# Patient Record
Sex: MALE | Race: WHITE
[De-identification: names, ages, dates, MRNs, and addresses within clinical notes are randomized per-mention and may not be internally consistent; named-entity substitution may affect disease eponyms.]

---

## 2020-09-14 ENCOUNTER — HOSPITAL ENCOUNTER (OUTPATIENT)
Dept: HOSPITAL 96 - M.LAB | Age: 40
End: 2020-09-14
Attending: SURGERY
Payer: COMMERCIAL

## 2020-09-14 DIAGNOSIS — Z20.828: ICD-10-CM

## 2020-09-14 DIAGNOSIS — K57.33: ICD-10-CM

## 2020-09-14 DIAGNOSIS — Z01.812: Primary | ICD-10-CM

## 2020-09-22 ENCOUNTER — HOSPITAL ENCOUNTER (INPATIENT)
Dept: HOSPITAL 96 - M.PRE | Age: 40
LOS: 4 days | Discharge: HOME | DRG: 348 | End: 2020-09-26
Attending: SURGERY | Admitting: SURGERY
Payer: COMMERCIAL

## 2020-09-22 VITALS — BODY MASS INDEX: 37.93 KG/M2 | HEIGHT: 72 IN | WEIGHT: 280 LBS

## 2020-09-22 DIAGNOSIS — K56.699: ICD-10-CM

## 2020-09-22 DIAGNOSIS — M25.512: ICD-10-CM

## 2020-09-22 DIAGNOSIS — K57.20: Primary | ICD-10-CM

## 2020-09-22 DIAGNOSIS — J45.909: ICD-10-CM

## 2020-09-22 DIAGNOSIS — K21.9: ICD-10-CM

## 2020-09-22 NOTE — OP
[address]  [address]                    OPERATIVE REPORT              
_______________________________________________________________________________
 
Name:       APRIL ARENAS                 Room:           [address] [place]  
.R.#:  [id number]      Account #:      [id number]  
Admission:  09/22/20     Attend Phys:    Mitzi Sandoval DO
Discharge:               Date of Birth:  [birth year]  
         Report #: [id number]
                                                                     [id number]  
_______________________________________________________________________________
THIS REPORT FOR:  //name//                      
 
cc:  DONATO - Margarette family physician/PCP 
     DONATO - Margarette family physician/PCP                                        ~
CC: Mitzi Sandoval
    [facility] physician/PCP
    Jefry Rasheed
 
DICTATED BY: Lasha Rose DO
 
DATE OF SERVICE:  09/22/2020
 
 
PREOPERATIVE DIAGNOSIS:  Diverticular stricture.
 
POSTOPERATIVE DIAGNOSIS:  Diverticular stricture.
 
PROCEDURE PERFORMED:  Laparoscopic sigmoidectomy with colorectal anastomosis,
splenic flexure takedown and rigid proctosigmoidoscopy.
 
ANESTHESIA:  General, regional and local.
 
PRIMARY SURGEON:  Mitzi Sandoval DO
 
CO-SURGEON:  Lasha Rose, PGY5
 
ASSISTANT:  Helio Latif, PGY4
 
COMPLICATIONS:  None.
 
ESTIMATED BLOOD LOSS:  50 mL.
 
FINDINGS:  Noted tattooing of colon, severe inflammation adherent to the left of
the sigmoid colon adherent to the left pelvic sidewall, associated inflamed and
adherent loop of small bowel, poor colon prep.
 
INDICATIONS:  The patient is a 39-year-old male with history of diverticulitis
requiring percutaneous drainage.  He recently underwent colonoscopy and was
found to have a diverticular stricture that was able to be traversed with a
pediatric colonoscope.  The patient was informed of the risks and benefits of
laparoscopic sigmoidectomy with risks including but not limited to bleeding,
infection, bowel injury, bladder injury, need for open procedure, anastomotic
leak, hernia formation, and catastrophic complications, including death.  The
patient was in agreement with the surgical plan and wished to proceed.
 
DESCRIPTION OF PROCEDURE:  After informed consent was obtained, the patient was
 
 
 
[place] [address]                    OPERATIVE REPORT              
_______________________________________________________________________________
 
Name:       APRIL ARENAS                 Room:           [address] [place]  
[facility]#:  [id number]      Account #:      [id number]  
Admission:  09/22/20     Attend Phys:    Mitzi Sandoval DO
Discharge:               Date of Birth:  [birth year]  
         Report #: [id number]
                                                                     [id number]  
_______________________________________________________________________________
brought to the operating room and placed in supine position.  SCDs were on and
running.  Preoperative antibiotics were given.  General anesthesia was
administered with an ET tube.  The patient received bilateral transversus
abdominis plane blocks.  The patient was positioned into lithotomy.  A Lujan
catheter was placed sterilely.  The patient was positioned with both arms tucked
and both arms were secured.  IVs were running and the pulse oximetry was intact.
 A surgical pause was held to confirm proper patient and procedure.  A
supraumbilical 2 cm incision was made with an 11 blade after local anesthetic
infiltration.  Dissection was bluntly carried down to the fascia, which was
elevated with 2 Kochers and incised using cautery.  Peritoneum was bluntly
entered using a Maru.  Bilateral stay sutures were placed using 0 Vicryl.  A
12-mm Eduard trocar was introduced and the abdomen was insufflated.  A brief
exploration was undertaken.  There was no obvious active inflammation or
infection.  A 5-mm port was placed in the right lower quadrant and an additional
12-mm port was placed in the suprapubic region.  Laparoscopic Babcocks were used
to reflect the sigmoid colon medially.  There was some inflammation chronic in
nature with adhesions from the sigmoid colon through the left pelvic sidewall. 
There was extremely hard and woody inflammation of the segment of the sigmoid
colon.  Distally, there was a tattoo noted at the distal rectosigmoid junction. 
The rectum and distal sigmoid appeared very soft.  The proximal sigmoid had a
separate area of distinct inflammation with an associated loop of small bowel
that was adherent, but proximal to this and the left colon was soft and there
was no obvious diverticula.  Blunt dissection was used to sweep the sigmoid
colon off of the pelvic sidewall using a suction .  The white line of
Toldt was opened using cautery as well as a LigaSure, 5-mm device.  Dissection
was carried up the sigmoid to the left colon and up to the splenic flexure. 
Splenic flexure was taken down with a combination of blunt dissection and the
LigaSure.  Dissection was then carried back down the left colon towards the
sigmoid.  Further dissection was used to completely mobilize the distinctly
chronically inflamed portion of the sigmoid, which just was right at the level
of the pelvic brim.  The peritoneum on the right and left of the rectosigmoid
was opened using cautery.  Once the lateral portion of the left colon splenic
flexure and sigmoid were completely mobilized, a window was created at the
distal extent of the inflammation using blunt dissection and the LigaSure.  Once
completely through the mesocolon at the rectosigmoid junction, a 45-mm purple
load Covidien stapler was fired across the rectum.  Two additional purple loads
45 mm were used to completely transect the colon.  Once completely free, the
staple line was inspected and the staple line was insufflated and there was no
leak at the rectal stump.  The mesocolon was then taken down using the LigaSure.
 Once adequately mobilized to the healthy portion of the left colon, a
laparoscopic grasper was placed on the distal portion of the transected sigmoid
and the abdomen was desufflated.  The supraumbilical incision was extended to
approximately 8 cm.  Dissection was carried through the fascia using cautery. 
An Ceferino wound protector was introduced.  The sigmoid was grasped and
exteriorized through the wound protector.  Once it was completely exteriorized,
the healthy bowel was grasped.  Heavy curved Mayos were used to transect the
 
 
 
[place] [id number]                    OPERATIVE REPORT              
_______________________________________________________________________________
 
Name:       APRIL ARENAS                 Room:           [address] [place]  
..#:  [id number]      Account #:      [id number]  
Admission:  09/22/20     Attend Phys:    Mitzi Sandoval DO
Discharge:               Date of Birth:  [birth year]  
         Report #: [id number]
                                                                     [id number]  
_______________________________________________________________________________
proximal portion of the sigmoid.  The specimen was completely free and handed
off to the backtable.  Allis clamps were used to grasp the proximal portion of
the bowel circumferentially.  The EEA sizers were used and the patient easily
accommodated a 31-mm sizer within his colon.  The 31-mm EEA was selected.  The
anvil was tagged with 0 silk and then introduced into the proximal colon.  A
60-mm purple load was fired across the colon after grasping the edges together
with an Allis.  This transected the silk stitch, which was grasped and retracted
to bring the spike through anterior to the staple line in a perfect position to
ensure seal around the base of the anvil.  The 0 silk was used to pursestring
around the base of the anvil.  Of note, prior to transecting and exteriorizing
the specimen, a loop of small bowel that was adherent to the chronically
inflamed portion of the sigmoid colon was dissected free from the colon using
laparoscopic glenn and blunt dissection.  Afterwards, this was closely
inspected, there were no serosal injuries, although this portion of the small
bowel was somewhat bruised from dissection, but appeared intact.  Succuss was
milked past this area with no leak and was reinspected at multiple points
throughout the case and was noted to be intact with no evidence of a
full-thickness injury or external serosal injury.  Once the anvil and spike were
secured, the spike was removed and the anvil was turned into the abdomen, the
cap was placed on the Ceferino wound protector.  The abdomen was reinsufflated. 
The sizers were passed transanally until the 31-mm sizer easily passed the 31-mm
EEA Covidien stapler was introduced through the anus up to the rectal staple
line.  The spike was opened just anterior to the rectal staple line.  The anvil
was secured to the spike and the EEA was closed and this stapler was deployed. 
The stapler easily removed from the anus.  Of note, the colon prep was poor and
there was stool throughout the entire colon.  A suction  was used to
irrigate the pelvis and a leak test was performed.  There were some bubbles from
a focal point at the anterior of the staple line.  The anastomosis was tension
free.  However, there was a small focal area of bubbling.  The EndoStitch with
an 0 Polysorb stitch was used to place a figure-of-eight over this area of the
staple line in a Lembert fashion.  This was tied and cut.  The leak test was
repeated and was negative.  There was no bubbling after inspection and
submersion of the staple line once more.  The staple line was covered as well as
the anterior abdominal structures with the omentum.  The 12-mm suprapubic port
was closed with an 0 Vicryl in a PMI device.  A SWAPNA drain was placed along the
left pericolic gutter and along the anastomosis through the right lower quadrant
5-mm port.  This was secured in place using a 2-0 nylon.  The extraction site
Ceferino wound protector was removed and the fascia was closed as well as the
peritoneum with a 2-0 Prolene in a running continuous manner from the superior
and inferior aspects of the wound and a small bites' fashion.  The wound was
then closed in layered fashion using 3-0 Vicryl and 4-0 Monocryl.  Remainder of
skin incisions were closed using 4-0 Monocryl.  Wounds were cleansed and dressed
 
 
 
[place] [address]                    OPERATIVE REPORT              
_______________________________________________________________________________
 
Name:       APRIL ARENAS                 Room:           [address] [place]  
..#:  [id number]      Account #:      [id number]  
Admission:  09/22/20     Attend Phys:    Mitzi Sandoval DO
Discharge:               Date of Birth:  [birth year]  
         Report #: [id number]
                                                                     [id number]  
_______________________________________________________________________________
with Dermabond.  A drain sponge was applied at the SWAPNA drain site.  The patient
was emerged from anesthesia and transferred to PACU in stable condition.
 
 
 
 
 
 
 
 
 
 
 
 
 
 
 
 
 
 
 
 
 
 
 
 
 
 
 
 
 
 
 
 
 
 
 
 
 
 
 
 
 
 
                       
                                        By:                                
                 
D: 09/22/20 2312_______________________________________
T: 09/22/20 2343Cvan Sandoval DO            /nt

## 2020-09-23 VITALS — SYSTOLIC BLOOD PRESSURE: 117 MMHG | DIASTOLIC BLOOD PRESSURE: 59 MMHG

## 2020-09-23 VITALS — DIASTOLIC BLOOD PRESSURE: 78 MMHG | SYSTOLIC BLOOD PRESSURE: 118 MMHG

## 2020-09-23 VITALS — DIASTOLIC BLOOD PRESSURE: 69 MMHG | SYSTOLIC BLOOD PRESSURE: 124 MMHG

## 2020-09-23 VITALS — DIASTOLIC BLOOD PRESSURE: 67 MMHG | SYSTOLIC BLOOD PRESSURE: 118 MMHG

## 2020-09-23 VITALS — DIASTOLIC BLOOD PRESSURE: 86 MMHG | SYSTOLIC BLOOD PRESSURE: 139 MMHG

## 2020-09-23 VITALS — SYSTOLIC BLOOD PRESSURE: 120 MMHG | DIASTOLIC BLOOD PRESSURE: 67 MMHG

## 2020-09-23 LAB
ABSOLUTE MONOCYTES: 0.5 THOU/UL (ref 0–1.2)
ANION GAP SERPL CALC-SCNC: 7 MMOL/L (ref 7–16)
BUN SERPL-MCNC: 11 MG/DL (ref 7–18)
CALCIUM SERPL-MCNC: 8.5 MG/DL (ref 8.5–10.1)
CHLORIDE SERPL-SCNC: 100 MMOL/L (ref 98–107)
CO2 SERPL-SCNC: 27 MMOL/L (ref 21–32)
CREAT SERPL-MCNC: 1 MG/DL (ref 0.6–1.3)
GLUCOSE SERPL-MCNC: 134 MG/DL (ref 70–99)
GRANULOCYTES NFR BLD MANUAL: 93 %
HCT VFR BLD CALC: 39.4 % (ref 42–52)
HGB BLD-MCNC: 13.4 GM/DL (ref 14–18)
LYMPHOCYTES # BLD: 0.6 THOU/UL (ref 0.8–5.3)
LYMPHOCYTES NFR BLD AUTO: 4 %
MCH RBC QN AUTO: 30.9 PG (ref 26–34)
MCHC RBC AUTO-ENTMCNC: 34 G/DL (ref 28–37)
MCV RBC: 90.9 FL (ref 80–100)
MONOCYTES NFR BLD: 3 %
MPV: 8.1 FL. (ref 7.2–11.1)
NEUTROPHILS # BLD: 14.8 THOU/UL (ref 1.6–8.1)
NUCLEATED RBCS: 0 /100WBC
PLATELET # BLD EST: ADEQUATE 10*3/UL
PLATELET COUNT*: 307 THOU/UL (ref 150–400)
POTASSIUM SERPL-SCNC: 4.3 MMOL/L (ref 3.5–5.1)
RBC # BLD AUTO: 4.34 MIL/UL (ref 4.5–6)
RBC MORPH BLD: NORMAL
RDW-CV: 13.1 % (ref 10.5–14.5)
SODIUM SERPL-SCNC: 134 MMOL/L (ref 136–145)
WBC # BLD AUTO: 15.9 THOU/UL (ref 4–11)

## 2020-09-24 VITALS — DIASTOLIC BLOOD PRESSURE: 61 MMHG | SYSTOLIC BLOOD PRESSURE: 127 MMHG

## 2020-09-24 VITALS — DIASTOLIC BLOOD PRESSURE: 71 MMHG | SYSTOLIC BLOOD PRESSURE: 110 MMHG

## 2020-09-24 VITALS — DIASTOLIC BLOOD PRESSURE: 73 MMHG | SYSTOLIC BLOOD PRESSURE: 126 MMHG

## 2020-09-24 VITALS — DIASTOLIC BLOOD PRESSURE: 80 MMHG | SYSTOLIC BLOOD PRESSURE: 116 MMHG

## 2020-09-24 VITALS — SYSTOLIC BLOOD PRESSURE: 123 MMHG | DIASTOLIC BLOOD PRESSURE: 69 MMHG

## 2020-09-24 VITALS — DIASTOLIC BLOOD PRESSURE: 62 MMHG | SYSTOLIC BLOOD PRESSURE: 108 MMHG

## 2020-09-24 LAB
ABSOLUTE BASOPHILS: 0 THOU/UL (ref 0–0.2)
ABSOLUTE EOSINOPHILS: 0.1 THOU/UL (ref 0–0.7)
ABSOLUTE MONOCYTES: 1.1 THOU/UL (ref 0–1.2)
ANION GAP SERPL CALC-SCNC: 5 MMOL/L (ref 7–16)
BASOPHILS NFR BLD AUTO: 0.3 %
BUN SERPL-MCNC: 8 MG/DL (ref 7–18)
CALCIUM SERPL-MCNC: 8.4 MG/DL (ref 8.5–10.1)
CHLORIDE SERPL-SCNC: 101 MMOL/L (ref 98–107)
CO2 SERPL-SCNC: 29 MMOL/L (ref 21–32)
CREAT SERPL-MCNC: 0.8 MG/DL (ref 0.6–1.3)
EOSINOPHIL NFR BLD: 1 %
GLUCOSE SERPL-MCNC: 103 MG/DL (ref 70–99)
GRANULOCYTES NFR BLD MANUAL: 72.5 %
HCT VFR BLD CALC: 35.9 % (ref 42–52)
HGB BLD-MCNC: 12.3 GM/DL (ref 14–18)
LYMPHOCYTES # BLD: 1.7 THOU/UL (ref 0.8–5.3)
LYMPHOCYTES NFR BLD AUTO: 16.1 %
MAGNESIUM SERPL-MCNC: 1.9 MG/DL (ref 1.8–2.4)
MCH RBC QN AUTO: 31.4 PG (ref 26–34)
MCHC RBC AUTO-ENTMCNC: 34.2 G/DL (ref 28–37)
MCV RBC: 91.9 FL (ref 80–100)
MONOCYTES NFR BLD: 10.1 %
MPV: 7.9 FL. (ref 7.2–11.1)
NEUTROPHILS # BLD: 7.7 THOU/UL (ref 1.6–8.1)
NUCLEATED RBCS: 0 /100WBC
PHOSPHATE SERPL-MCNC: 2.3 MG/DL (ref 2.5–4.9)
PLATELET COUNT*: 257 THOU/UL (ref 150–400)
POTASSIUM SERPL-SCNC: 3.4 MMOL/L (ref 3.5–5.1)
RBC # BLD AUTO: 3.91 MIL/UL (ref 4.5–6)
RDW-CV: 13.1 % (ref 10.5–14.5)
SODIUM SERPL-SCNC: 135 MMOL/L (ref 136–145)
WBC # BLD AUTO: 10.7 THOU/UL (ref 4–11)

## 2020-09-25 VITALS — SYSTOLIC BLOOD PRESSURE: 116 MMHG | DIASTOLIC BLOOD PRESSURE: 68 MMHG

## 2020-09-25 VITALS — SYSTOLIC BLOOD PRESSURE: 119 MMHG | DIASTOLIC BLOOD PRESSURE: 65 MMHG

## 2020-09-25 VITALS — DIASTOLIC BLOOD PRESSURE: 60 MMHG | SYSTOLIC BLOOD PRESSURE: 107 MMHG

## 2020-09-25 LAB
ABSOLUTE BASOPHILS: 0 THOU/UL (ref 0–0.2)
ABSOLUTE EOSINOPHILS: 0.3 THOU/UL (ref 0–0.7)
ABSOLUTE MONOCYTES: 1.2 THOU/UL (ref 0–1.2)
ANION GAP SERPL CALC-SCNC: 5 MMOL/L (ref 7–16)
BASOPHILS NFR BLD AUTO: 0.4 %
BUN SERPL-MCNC: 6 MG/DL (ref 7–18)
CALCIUM SERPL-MCNC: 8.7 MG/DL (ref 8.5–10.1)
CHLORIDE SERPL-SCNC: 103 MMOL/L (ref 98–107)
CO2 SERPL-SCNC: 28 MMOL/L (ref 21–32)
CREAT SERPL-MCNC: 0.8 MG/DL (ref 0.6–1.3)
EOSINOPHIL NFR BLD: 2.5 %
GLUCOSE SERPL-MCNC: 101 MG/DL (ref 70–99)
GRANULOCYTES NFR BLD MANUAL: 65.7 %
HCT VFR BLD CALC: 34.7 % (ref 42–52)
HGB BLD-MCNC: 12 GM/DL (ref 14–18)
LYMPHOCYTES # BLD: 2 THOU/UL (ref 0.8–5.3)
LYMPHOCYTES NFR BLD AUTO: 19.5 %
MAGNESIUM SERPL-MCNC: 1.9 MG/DL (ref 1.8–2.4)
MCH RBC QN AUTO: 31.8 PG (ref 26–34)
MCHC RBC AUTO-ENTMCNC: 34.6 G/DL (ref 28–37)
MCV RBC: 92 FL (ref 80–100)
MONOCYTES NFR BLD: 11.9 %
MPV: 7.8 FL. (ref 7.2–11.1)
NEUTROPHILS # BLD: 6.8 THOU/UL (ref 1.6–8.1)
NUCLEATED RBCS: 0 /100WBC
PHOSPHATE SERPL-MCNC: 3.2 MG/DL (ref 2.5–4.9)
PLATELET COUNT*: 265 THOU/UL (ref 150–400)
POTASSIUM SERPL-SCNC: 3.9 MMOL/L (ref 3.5–5.1)
RBC # BLD AUTO: 3.77 MIL/UL (ref 4.5–6)
RDW-CV: 13 % (ref 10.5–14.5)
SODIUM SERPL-SCNC: 136 MMOL/L (ref 136–145)
WBC # BLD AUTO: 10.4 THOU/UL (ref 4–11)

## 2020-09-25 NOTE — PATH
[address]  [address]                    PATHOLOGY RPT PROCEDURE       
_______________________________________________________________________________
 
Name:       ENRIQUE RICH                 Room:           [address] [place]  
..#:  [id number]      Account #:      [id number]  
Admission:  09/22/20     Date of Birth:  [birth year]  
Discharge:                             Report #:    [id number]
                                                         Path Case #: [id number]
_______________________________________________________________________________
 
[facility] Accession Number: [id number]
.                                                                01
Material submitted:                                        .
sigmoid colon - SIGMOID COLON AND ANASTOMOTIC RINGS
.                                                                01
Clinical history:                                          .
DIVERTICULAR STRICTURE
.                                                                02
**********************************************************************
Diagnosis:
Sigmoid colon and anastomotic rings:
- Segment of benign colon with severe diverticular disease including
chronic and acute diverticulitis with perforation, abscess formation,
fibrosis, acute and chronic serositis and serosal adhesions.
- Five benign and hyperplastic pericolic lymph nodes.
- Two benign colonic anastomotic rings.
(FREDDIE:frank; 09/25/2020)
MBJAYESH  09/25/2020  1658 Local
**********************************************************************
.                                                                02
Electronically signed:                                     .
Khanh Quispe MD, Pathologist
NPI- [id number]
.                                                                01
Gross description:                                         .
The specimen is received in formalin, labeled "Enrique Rich, sigmoid colon
and anastomotic rings".  Received is an unoriented segment of colon
measuring 13.5 cm in length by 3.0 cm in diameter.  One margin is stapled
closed and the opposite margin is open.  The serosal surface is dusky
gray-brown and covered with overlying adhesions.  The attached pericolic
fat measures up to 2.5 cm in thickness.  The specimen is opened along the
antimesenteric line to reveal pink-tan mucosa with normal architectural
folds.  An area of abscess (questionable perforation) is identified in the
attached pericolic fat measuring 3.5 cm in length by up to 1.5 cm in
diameter filled with fecal material.  Multiple diverticula are identified,
several of which are hemorrhagic in appearance and filled with fecal
material.  Sectioning through the attached pericolic fat reveals five
readily identifiable lymph nodes ranging in size from 0.3 to 1.1 cm in
maximum dimensions.
.
Also received within the specimen container are two anastomotic rings of
light tan to gray-tan mucosa measuring 2.2 x 2.1 x 1.4 and 4.5 x 1.3 x 1.0
cm.  Both rings have staples present.  The specimen is submitted
representatively as follows:
.
A1     stapled margin
A2     open margin
A3-A6  representative sections of area of abscess (questionable
 
[place] [address]                    PATHOLOGY RPT PROCEDURE       
_______________________________________________________________________________
 
Name:       ENRIQUE RICH                 Room:           [address] [place]  
[facility].#:  [id number]      Account #:      [id number]  
Admission:  09/22/20     Date of Birth:  [birth year]  
Discharge:                             Report #:    [id number]
                                                         Path Case #: [id number]
_______________________________________________________________________________
perforation)
A7-A9  representative sections of diverticula
A10    intact lymph nodes
A11    one bisected lymph node
A12    one trisected lymph node
A13    representative sections from each anastomotic ring.
(CAA; 9/24/2020)
QAC/QAC  09/25/2020  1627 Local
.                                                                02
Pathologist provided ICD-10:
K57.32, K57.30, K65.8
.                                                                02
CPT                                                        .
736322
Specimen Comment: A courtesy copy of this report has been sent to [phone]
Specimen Comment: Report sent to 
***Performed at:  01
   [facility], [place]  [id number]
   MD Morgan Sears MD Phone:  [phone]
***Performed at:  02
   [facility]
   [address], [place], [place]  [id number]
   MD Khanh Quispe MD Phone:  [phone]

## 2020-09-26 VITALS — DIASTOLIC BLOOD PRESSURE: 81 MMHG | SYSTOLIC BLOOD PRESSURE: 127 MMHG

## 2020-09-26 VITALS — SYSTOLIC BLOOD PRESSURE: 127 MMHG | DIASTOLIC BLOOD PRESSURE: 81 MMHG

## 2020-09-26 LAB
ABSOLUTE BASOPHILS: 0 THOU/UL (ref 0–0.2)
ABSOLUTE EOSINOPHILS: 0.3 THOU/UL (ref 0–0.7)
ABSOLUTE MONOCYTES: 0.7 THOU/UL (ref 0–1.2)
ANION GAP SERPL CALC-SCNC: 6 MMOL/L (ref 7–16)
BASOPHILS NFR BLD AUTO: 0.6 %
BUN SERPL-MCNC: 6 MG/DL (ref 7–18)
CALCIUM SERPL-MCNC: 9.1 MG/DL (ref 8.5–10.1)
CHLORIDE SERPL-SCNC: 102 MMOL/L (ref 98–107)
CO2 SERPL-SCNC: 31 MMOL/L (ref 21–32)
CREAT SERPL-MCNC: 1 MG/DL (ref 0.6–1.3)
EOSINOPHIL NFR BLD: 3.4 %
GLUCOSE SERPL-MCNC: 91 MG/DL (ref 70–99)
GRANULOCYTES NFR BLD MANUAL: 62.2 %
HCT VFR BLD CALC: 36.9 % (ref 42–52)
HGB BLD-MCNC: 12.6 GM/DL (ref 14–18)
LYMPHOCYTES # BLD: 2 THOU/UL (ref 0.8–5.3)
LYMPHOCYTES NFR BLD AUTO: 24.5 %
MAGNESIUM SERPL-MCNC: 2.2 MG/DL (ref 1.8–2.4)
MCH RBC QN AUTO: 31.5 PG (ref 26–34)
MCHC RBC AUTO-ENTMCNC: 34.3 G/DL (ref 28–37)
MCV RBC: 91.8 FL (ref 80–100)
MONOCYTES NFR BLD: 9.3 %
MPV: 8.1 FL. (ref 7.2–11.1)
NEUTROPHILS # BLD: 5 THOU/UL (ref 1.6–8.1)
NUCLEATED RBCS: 0 /100WBC
PHOSPHATE SERPL-MCNC: 3.8 MG/DL (ref 2.5–4.9)
PLATELET COUNT*: 310 THOU/UL (ref 150–400)
POTASSIUM SERPL-SCNC: 3.9 MMOL/L (ref 3.5–5.1)
RBC # BLD AUTO: 4.01 MIL/UL (ref 4.5–6)
RDW-CV: 12.8 % (ref 10.5–14.5)
SODIUM SERPL-SCNC: 139 MMOL/L (ref 136–145)
WBC # BLD AUTO: 8 THOU/UL (ref 4–11)

## 2020-12-29 ENCOUNTER — HOSPITAL ENCOUNTER (EMERGENCY)
Dept: HOSPITAL 96 - M.ERS | Age: 40
Discharge: HOME | End: 2020-12-29
Payer: COMMERCIAL

## 2020-12-29 VITALS — SYSTOLIC BLOOD PRESSURE: 112 MMHG | DIASTOLIC BLOOD PRESSURE: 70 MMHG

## 2020-12-29 VITALS — WEIGHT: 285.01 LBS | HEIGHT: 73 IN | BODY MASS INDEX: 37.77 KG/M2

## 2020-12-29 DIAGNOSIS — K52.9: Primary | ICD-10-CM

## 2020-12-29 DIAGNOSIS — K21.9: ICD-10-CM

## 2020-12-29 DIAGNOSIS — Z90.49: ICD-10-CM

## 2020-12-29 DIAGNOSIS — K43.9: ICD-10-CM

## 2020-12-29 DIAGNOSIS — J45.909: ICD-10-CM

## 2020-12-29 LAB
ABSOLUTE BASOPHILS: 0 THOU/UL (ref 0–0.2)
ABSOLUTE EOSINOPHILS: 0.2 THOU/UL (ref 0–0.7)
ABSOLUTE MONOCYTES: 0.7 THOU/UL (ref 0–1.2)
ALBUMIN SERPL-MCNC: 3.9 G/DL (ref 3.4–5)
ALP SERPL-CCNC: 84 U/L (ref 46–116)
ALT SERPL-CCNC: 43 U/L (ref 30–65)
ANION GAP SERPL CALC-SCNC: 5 MMOL/L (ref 7–16)
AST SERPL-CCNC: 36 U/L (ref 15–37)
BASOPHILS NFR BLD AUTO: 0.7 %
BILIRUB SERPL-MCNC: 0.7 MG/DL
BILIRUB UR-MCNC: NEGATIVE MG/DL
BUN SERPL-MCNC: 11 MG/DL (ref 7–18)
CALCIUM SERPL-MCNC: 8.8 MG/DL (ref 8.5–10.1)
CHLORIDE SERPL-SCNC: 102 MMOL/L (ref 98–107)
CO2 SERPL-SCNC: 28 MMOL/L (ref 21–32)
COLOR UR: YELLOW
CREAT SERPL-MCNC: 0.9 MG/DL (ref 0.6–1.3)
EOSINOPHIL NFR BLD: 2.9 %
GLUCOSE SERPL-MCNC: 90 MG/DL (ref 70–99)
GRANULOCYTES NFR BLD MANUAL: 51.2 %
HCT VFR BLD CALC: 43.7 % (ref 42–52)
HGB BLD-MCNC: 14.9 GM/DL (ref 14–18)
KETONES UR STRIP-MCNC: NEGATIVE MG/DL
LIPASE: 75 U/L (ref 73–393)
LYMPHOCYTES # BLD: 2.1 THOU/UL (ref 0.8–5.3)
LYMPHOCYTES NFR BLD AUTO: 33.3 %
MCH RBC QN AUTO: 31.3 PG (ref 26–34)
MCHC RBC AUTO-ENTMCNC: 34.1 G/DL (ref 28–37)
MCV RBC: 91.8 FL (ref 80–100)
MONOCYTES NFR BLD: 11.9 %
MPV: 7.5 FL. (ref 7.2–11.1)
NEUTROPHILS # BLD: 3.2 THOU/UL (ref 1.6–8.1)
NUCLEATED RBCS: 0 /100WBC
PLATELET COUNT*: 237 THOU/UL (ref 150–400)
POTASSIUM SERPL-SCNC: 4.1 MMOL/L (ref 3.5–5.1)
PROT SERPL-MCNC: 8.4 G/DL (ref 6.4–8.2)
PROT UR QL STRIP: NEGATIVE
RBC # BLD AUTO: 4.76 MIL/UL (ref 4.5–6)
RBC # UR STRIP: NEGATIVE /UL
RDW-CV: 13.1 % (ref 10.5–14.5)
SODIUM SERPL-SCNC: 135 MMOL/L (ref 136–145)
SP GR UR STRIP: 1.02 (ref 1–1.03)
URINE CLARITY: CLEAR
URINE GLUCOSE-RANDOM: NEGATIVE
URINE LEUKOCYTES-REFLEX: NEGATIVE
URINE NITRITE-REFLEX: NEGATIVE
UROBILINOGEN UR STRIP-ACNC: 0.2 E.U./DL (ref 0.2–1)
WBC # BLD AUTO: 6.2 THOU/UL (ref 4–11)

## 2021-01-21 ENCOUNTER — HOSPITAL ENCOUNTER (OUTPATIENT)
Dept: HOSPITAL 96 - M.SUR | Age: 41
Discharge: HOME | End: 2021-01-21
Attending: SURGERY
Payer: COMMERCIAL

## 2021-01-21 DIAGNOSIS — Z79.899: ICD-10-CM

## 2021-01-21 DIAGNOSIS — J45.909: ICD-10-CM

## 2021-01-21 DIAGNOSIS — K43.2: Primary | ICD-10-CM

## 2021-01-21 DIAGNOSIS — Z98.890: ICD-10-CM

## 2021-01-21 DIAGNOSIS — Z20.828: ICD-10-CM

## 2021-01-21 DIAGNOSIS — K21.9: ICD-10-CM

## 2021-01-21 NOTE — OP
[address]  [address]                    OPERATIVE REPORT              
_______________________________________________________________________________
 
Name:       APRIL ARENAS                 Room:                      [facility]#:  [id number]      Account #:      [id number]  
Admission:  01/21/21     Attend Phys:    Mitzi Sandoval DO
Discharge:               Date of Birth:  [birth year]  
         Report #: [id number]
                                                                     [id number]  
_______________________________________________________________________________
THIS REPORT FOR:  
 
cc:  FAM - No family physician/PCP 
     FAM - No family physician/PCP                                        ~
     Mitzi Sandoval DO          
 
DATE OF SERVICE:  01/21/2021
 
 
PREOPERATIVE DIAGNOSIS:  Incisional hernia.
 
POSTOPERATIVE DIAGNOSIS:  Incisional hernia.
 
FINDINGS:  A 2 x 3 cm incisional hernia containing omentum, which was reducible.
 
SURGEON:  Mitzi Sandoval DO
 
COSURGEON:  Lasha Rose, PGY-5
 
ASSISTANT:  ROB Lin student.
 
OPERATION PERFORMED:  Incisional hernia repair with mesh.
 
ANESTHESIA:  LMA, local and TAP's block.
 
ESTIMATED BLOOD LOSS:  5 mL.
 
DRAINS:  None.
 
SPECIMENS:  Hernia sac.
 
COMPLICATIONS:  None.
 
CONDITION:  Stable.
 
DISPOSITION:  PACU to home.
 
HISTORY OF PRESENT ILLNESS:  The patient is a very pleasant 40-year-old
gentleman who is well known to me after surgery last year for recurrent
diverticulitis.  He returned to my office with complaint of a bulge at the
extraction site, which was just above his umbilicus.  On physical exam, he had
an obvious incisional hernia.  He was then consented for incisional hernia
repair, possible mesh.  Risks discussed included bleeding, infection, pain, scar
formation, injury to bowel, recurrence of the hernia, mesh complications and
risks of general anesthesia.  The patient understood these risks and elected to
proceed.
 
 
 
[place] [address]                    OPERATIVE REPORT              
_______________________________________________________________________________
 
Name:       APRIL ARENAS                 Room:                      [facility].#:  [id number]      Account #:      [id number]  
Admission:  01/21/21     Attend Phys:    Mitzi Sandoval DO
Discharge:               Date of Birth:  [birth year]  
         Report #: [id number]
                                                                     [id number]  
_______________________________________________________________________________
 
 
DESCRIPTION OF PROCEDURE:  The patient was brought to the operating room.  He
was laid supine on the operating room table.  SCDs were placed on bilateral
lower extremities.  Ancef 3 grams was given in the perioperative period. 
General LMA anesthesia was induced by Anesthesia without difficulty.  TAP's
blocks were then also provided by Anesthesia without issue.  Abdomen was prepped
and draped in standard sterile fashion.  Timeout was performed to verify patient
and procedure.  A 10 mL of 0.5% Marcaine were injected in the area of the old
incision just above the umbilicus.  Incision was reopened using a #15 blade. 
Cautery was used for hemostasis.  Cautery was then used to dissect down through
the subcutaneous tissues until the fascia was identified.  Hernia sac was then
also clearly visible.  Hernia sac was gently elevated using a Maru clamp and
the hernia sac was completely dissected free from the surrounding subcutaneous
tissue.  Fascial edges were then also cleared for approximately 2 cm surrounding
hernia defect.  Hernia was soft and easily reducible.  It appeared to contain
omentum.  There was a large redundancy of hernia sac, so the hernia sac was
gently dissected free from the surrounding hernia defect and was handed off for
specimen.  Finger was introduced into the abdomen.  No adhesions were
identified.  There was a very small weakness, just inferior to the hernia
defect.  That area was easily visualized.  The hernia defect was elevated and 1
stitch of 0 Prolene was placed in an inverted fashion at that area of the
weakness.  The defect was then measured, it measured 2 x 3 cm.  A medium
Ventralex ST [place] mesh was then brought onto the field and easily deployed
within the defect.  Care was taken to make sure that the mesh was well deployed
and that there were no intra-abdominal contents caught between the abdominal
wall and the mesh.  Mesh was then sutured into place.  It was initially tacked
into place at all 4 cardinal points using a figure-of-eight stitch of 0 Prolene.
 The tail of the mesh was then removed.  The hernia defect was then closed,
taking care to incorporate portions of the mesh with multiple interrupted
stitches of 0 Prolene with excellent approximation of the hernia defect.  An
additional 20 mL of 0.5% Marcaine were injected in the fascia.  Wound was then
closed in a layered fashion using deep and superficial stitches of 3-0 Vicryl in
inverted interrupted fashion.  Skin wound was closed with running 4-0 Monocryl. 
A total of 30 mL of 0.5% Marcaine were used to anesthetize the wound.  Wound was
then cleansed and covered with Dermabond.  The patient was then allowed to
awaken from anesthesia, was extubated and transported to the recovery room with
no further difficulties.  Counts were correct x 2 at the conclusion of the case.
 Binder was placed in the operating room.
 
 
 
 
 
<ELECTRONICALLY SIGNED>
                                        By:  Mitzi Sandoval DO         
01/21/21     1117
D: 01/21/21 1016_______________________________________
T: 01/21/21 1059Chboogie Sandoval DO            /nt

## 2021-08-21 ENCOUNTER — HOSPITAL ENCOUNTER (EMERGENCY)
Dept: HOSPITAL 96 - M.ERS | Age: 41
Discharge: HOME | End: 2021-08-21
Payer: COMMERCIAL

## 2021-08-21 VITALS — SYSTOLIC BLOOD PRESSURE: 130 MMHG | DIASTOLIC BLOOD PRESSURE: 86 MMHG

## 2021-08-21 VITALS — WEIGHT: 290 LBS | BODY MASS INDEX: 39.28 KG/M2 | HEIGHT: 72 IN

## 2021-08-21 DIAGNOSIS — K42.9: Primary | ICD-10-CM

## 2021-08-21 DIAGNOSIS — J45.909: ICD-10-CM

## 2021-08-21 DIAGNOSIS — K21.9: ICD-10-CM

## 2021-08-21 LAB
ABSOLUTE BASOPHILS: 0.1 THOU/UL (ref 0–0.2)
ABSOLUTE EOSINOPHILS: 0.2 THOU/UL (ref 0–0.7)
ABSOLUTE MONOCYTES: 0.8 THOU/UL (ref 0–1.2)
ALBUMIN SERPL-MCNC: 4 G/DL (ref 3.4–5)
ALP SERPL-CCNC: 101 U/L (ref 46–116)
ALT SERPL-CCNC: 78 U/L (ref 30–65)
ANION GAP SERPL CALC-SCNC: 9 MMOL/L (ref 7–16)
AST SERPL-CCNC: 49 U/L (ref 15–37)
BASOPHILS NFR BLD AUTO: 1 %
BILIRUB SERPL-MCNC: 0.6 MG/DL
BUN SERPL-MCNC: 10 MG/DL (ref 7–18)
CALCIUM SERPL-MCNC: 8.9 MG/DL (ref 8.5–10.1)
CHLORIDE SERPL-SCNC: 109 MMOL/L (ref 98–107)
CO2 SERPL-SCNC: 25 MMOL/L (ref 21–32)
CREAT SERPL-MCNC: 0.9 MG/DL (ref 0.6–1.3)
EOSINOPHIL NFR BLD: 2.6 %
GLUCOSE SERPL-MCNC: 93 MG/DL (ref 70–99)
GRANULOCYTES NFR BLD MANUAL: 48.8 %
HCT VFR BLD CALC: 45.2 % (ref 42–52)
HGB BLD-MCNC: 15.1 GM/DL (ref 14–18)
LIPASE: 88 U/L (ref 73–393)
LYMPHOCYTES # BLD: 2.6 THOU/UL (ref 0.8–5.3)
LYMPHOCYTES NFR BLD AUTO: 36.2 %
MCH RBC QN AUTO: 31.2 PG (ref 26–34)
MCHC RBC AUTO-ENTMCNC: 33.3 G/DL (ref 28–37)
MCV RBC: 93.5 FL (ref 80–100)
MONOCYTES NFR BLD: 11.4 %
MPV: 8.3 FL. (ref 7.2–11.1)
NEUTROPHILS # BLD: 3.5 THOU/UL (ref 1.6–8.1)
NUCLEATED RBCS: 0 /100WBC
PLATELET COUNT*: 236 THOU/UL (ref 150–400)
POTASSIUM SERPL-SCNC: 4.5 MMOL/L (ref 3.5–5.1)
PROT SERPL-MCNC: 8.4 G/DL (ref 6.4–8.2)
RBC # BLD AUTO: 4.83 MIL/UL (ref 4.5–6)
RDW-CV: 12.7 % (ref 10.5–14.5)
SODIUM SERPL-SCNC: 143 MMOL/L (ref 136–145)
WBC # BLD AUTO: 7.2 THOU/UL (ref 4–11)

## 2021-08-22 NOTE — EKG
[place] [address]
Phone:  [phone]                     ELECTROCARDIOGRAM REPORT      
_______________________________________________________________________________
 
Name:         APRIL ARENAS                Room:                     [facility]#:    [id number]     Account #:     [id number]  
Admission:    21    Attend Phys:                     
Discharge:    21    Date of Birth: [birth year]  
Date of Service: 21 1052  Report #:      [id number]
        [id number]
_______________________________________________________________________________
THIS REPORT FOR:  //name//                      
 
                         [facility]
                                       
Test Date:    2021               Test Time:    10:52:00
Pat Name:     APRIL ARENAS             Department:   
Patient ID:   [id number]            Room:          
Gender:                               Technician:   
:          1980               Requested By: Cheyanne Rain
Order Number: [id number] MD:   Karan Pickett
                                 Measurements
Intervals                              Axis          
Rate:         52                       P:            27
IA:           112                      QRS:          22
QRSD:         88                       T:            18
QT:           416                                    
QTc:          387                                    
                           Interpretive Statements
Sinus rhythm
Borderline short IA interval
No previous ECG available for comparison
Electronically Signed On 2021 15:21:40 CDT by Karan Pickett
[email or web address]
 
 
 
 
 
 
 
 
 
 
 
 
 
 
 
 
 
 
 
 
 
  <ELECTRONICALLY SIGNED>
                                           By: Karan Pickett MD, [facility]   
  21     1521
D: 21 105   _____________________________________
T: 21 105   Karan Pickett MD, [facility]     /EPI